# Patient Record
(demographics unavailable — no encounter records)

---

## 2024-11-20 NOTE — ASSESSMENT
[FreeTextEntry1] : Ms. Sergio Moya is a 24 year old woman with no significant past medical history who presents for evaluation of an incidentally found lung nodule.  Most likely benign in nature, but will obtain a dedicated CT chest to ensure there are no additional nodules. If the nodule is stable and there are no additional nodules, will not need to follow the nodule. Won't set up any formal follow-up just yet but will call her with results (needs a ).

## 2024-11-20 NOTE — PHYSICAL EXAM
[No Acute Distress] : no acute distress [Normal Rate/Rhythm] : normal rate/rhythm [Normal S1, S2] : normal s1, s2 [No Murmurs] : no murmurs [No Resp Distress] : no resp distress [Clear to Auscultation Bilaterally] : clear to auscultation bilaterally [No Edema] : no edema [Oriented x3] : oriented x3 [Normal Affect] : normal affect

## 2024-11-20 NOTE — HISTORY OF PRESENT ILLNESS
[TextBox_4] : Ms. Sergio Moya is a 24 year old woman with no significant past medical history who presents for evaluation of an incidentally found lung nodule.  She went to the ER in June for some 4 years of back pain after having lifted a heavy item. She also endorsed numbness/tingling on the left side. She underwent CT scans of her spine as well as her abdomen, which incidentally picked up a 4mm pulmonary nodule.  She is a lifelong never smoker and is not working currently, but in the past worked in a [employer] and for [employer] with heavy packages. She is from [place] originally, moved to the  in 2016, and while in [place] her parents cooked with coal and wood both inside the house and outside. No cough, no breathing issues.  CT abdomen/pelvis 6/2024: A 4 mm calcified granuloma is seen in the right lower lobe.

## 2024-11-20 NOTE — REVIEW OF SYSTEMS
[Fever] : no fever [Chills] : no chills [Cough] : no cough [Dyspnea] : no dyspnea [SOB on Exertion] : no sob on exertion [Arthralgias] : arthralgias [Headache] : no headache [Dizziness] : no dizziness